# Patient Record
Sex: FEMALE | Race: WHITE | URBAN - METROPOLITAN AREA
[De-identification: names, ages, dates, MRNs, and addresses within clinical notes are randomized per-mention and may not be internally consistent; named-entity substitution may affect disease eponyms.]

---

## 2021-02-17 ENCOUNTER — OFFICE VISIT (OUTPATIENT)
Dept: ORTHOPEDICS CLINIC | Facility: CLINIC | Age: 61
End: 2021-02-17
Payer: COMMERCIAL

## 2021-02-17 DIAGNOSIS — M17.12 PRIMARY OSTEOARTHRITIS OF LEFT KNEE: Primary | ICD-10-CM

## 2021-02-17 PROCEDURE — 99212 OFFICE O/P EST SF 10 MIN: CPT | Performed by: ORTHOPAEDIC SURGERY

## 2021-02-17 PROCEDURE — 20610 DRAIN/INJ JOINT/BURSA W/O US: CPT | Performed by: ORTHOPAEDIC SURGERY

## 2021-02-17 RX ORDER — TRIAMCINOLONE ACETONIDE 40 MG/ML
40 INJECTION, SUSPENSION INTRA-ARTICULAR; INTRAMUSCULAR ONCE
Status: COMPLETED | OUTPATIENT
Start: 2021-02-17 | End: 2021-02-17

## 2021-02-17 RX ORDER — MULTIVIT-MIN/IRON/FOLIC ACID/K 18-600-40
CAPSULE ORAL
COMMUNITY

## 2021-02-17 RX ADMIN — TRIAMCINOLONE ACETONIDE 40 MG: 40 INJECTION, SUSPENSION INTRA-ARTICULAR; INTRAMUSCULAR at 10:32:00

## 2021-02-17 NOTE — PROGRESS NOTES
Patient is a 46-year-old white female here for follow-up. Patient has had a prior ACL reconstruction of her left knee and is also had a prior diagnosis of degenerative arthritis of the knee.   We have done cortisone injections and its been over a year [facility]

## 2021-07-06 ENCOUNTER — TELEPHONE (OUTPATIENT)
Dept: ORTHOPEDICS CLINIC | Facility: CLINIC | Age: 61
End: 2021-07-06

## 2021-07-06 DIAGNOSIS — M17.12 PRIMARY OSTEOARTHRITIS OF LEFT KNEE: Primary | ICD-10-CM

## 2021-07-15 ENCOUNTER — TELEPHONE (OUTPATIENT)
Dept: ORTHOPEDICS CLINIC | Facility: CLINIC | Age: 61
End: 2021-07-15

## 2021-07-15 NOTE — TELEPHONE ENCOUNTER
Called and lmom for Alessia Reed, I informed her that we were able to get her approved for synvisc one through her insurance. I also let her know that she could give the office a call to set up an appointment.

## 2021-07-22 NOTE — TELEPHONE ENCOUNTER
Called and spoke with Alesha Ann, informed her that she was approved through her insurance for the injection. I was able to set her up with an appointment with Dr José Miguel Huang on 8/24/21.

## 2021-07-22 NOTE — TELEPHONE ENCOUNTER
Called and lmom for Baldemar Ford in regards to her referral for her to give the office a call back.

## 2021-07-22 NOTE — TELEPHONE ENCOUNTER
Spoke with Lalo Barajas regarding her referral, letting her know that she was approved for synvisc 1 and not the synvisc 3 series. I also let her know that it is only going to be 1 injection instead of the 3 separate injections.  Patient was relived she is still sc

## 2021-08-24 ENCOUNTER — OFFICE VISIT (OUTPATIENT)
Dept: ORTHOPEDICS CLINIC | Facility: CLINIC | Age: 61
End: 2021-08-24
Payer: COMMERCIAL

## 2021-08-24 DIAGNOSIS — M17.12 PRIMARY OSTEOARTHRITIS OF LEFT KNEE: Primary | ICD-10-CM

## 2021-08-24 PROCEDURE — 20610 DRAIN/INJ JOINT/BURSA W/O US: CPT | Performed by: ORTHOPAEDIC SURGERY

## 2021-08-24 PROCEDURE — 99212 OFFICE O/P EST SF 10 MIN: CPT | Performed by: ORTHOPAEDIC SURGERY

## 2021-08-24 NOTE — PROGRESS NOTES
Patient is a 26-year-old white female who I last saw back in February of this year is here for follow-up. Patient has had a diagnosis of degenerative arthritis of her knee.   She has had cortisone injections in the past which have been helpful but only of